# Patient Record
Sex: FEMALE | Race: WHITE | NOT HISPANIC OR LATINO | URBAN - METROPOLITAN AREA
[De-identification: names, ages, dates, MRNs, and addresses within clinical notes are randomized per-mention and may not be internally consistent; named-entity substitution may affect disease eponyms.]

---

## 2022-04-06 ENCOUNTER — FOLLOW UP (OUTPATIENT)
Dept: URBAN - METROPOLITAN AREA CLINIC 29 | Facility: CLINIC | Age: 82
End: 2022-04-06

## 2022-04-06 DIAGNOSIS — H16.223: ICD-10-CM

## 2022-04-06 DIAGNOSIS — Z96.1: ICD-10-CM

## 2022-04-06 PROCEDURE — 99203 OFFICE O/P NEW LOW 30 MIN: CPT

## 2022-04-06 ASSESSMENT — VISUAL ACUITY
OD_SC: 20/40
OS_SC: 20/30+1
OD_PH: 20/30-2

## 2022-04-06 NOTE — PATIENT DISCUSSION
Lens affected by dryness and residual refractive error had surgery in [place] get old records per pt she did have LVC after but it did not helpWill treat dryness first.

## 2022-05-06 ENCOUNTER — FOLLOW UP (OUTPATIENT)
Dept: URBAN - METROPOLITAN AREA CLINIC 29 | Facility: CLINIC | Age: 82
End: 2022-05-06

## 2022-05-06 DIAGNOSIS — H16.223: ICD-10-CM

## 2022-05-06 DIAGNOSIS — Z96.1: ICD-10-CM

## 2022-05-06 DIAGNOSIS — Z98.890: ICD-10-CM

## 2022-05-06 PROCEDURE — 99213 OFFICE O/P EST LOW 20 MIN: CPT

## 2022-05-06 PROCEDURE — 92025 CPTRIZED CORNEAL TOPOGRAPHY: CPT

## 2022-05-06 ASSESSMENT — VISUAL ACUITY
OS_SC: 20/25-3
OD_PH: 20/40+1
OD_SC: 20/50-1

## 2022-05-06 NOTE — PATIENT DISCUSSION
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

## 2022-07-08 ENCOUNTER — FOLLOW UP (OUTPATIENT)
Dept: URBAN - METROPOLITAN AREA CLINIC 29 | Facility: CLINIC | Age: 82
End: 2022-07-08

## 2022-07-08 DIAGNOSIS — Z96.1: ICD-10-CM

## 2022-07-08 DIAGNOSIS — H16.223: ICD-10-CM

## 2022-07-08 PROCEDURE — 99213 OFFICE O/P EST LOW 20 MIN: CPT

## 2022-07-08 PROCEDURE — 92025 CPTRIZED CORNEAL TOPOGRAPHY: CPT

## 2022-07-08 PROCEDURE — 83861 MICROFLUID ANALY TEARS: CPT

## 2022-07-08 ASSESSMENT — VISUAL ACUITY
OS_SC: 20/30
OD_SC: 20/25-2

## 2023-01-30 ENCOUNTER — FOLLOW UP (OUTPATIENT)
Dept: URBAN - METROPOLITAN AREA CLINIC 29 | Facility: CLINIC | Age: 83
End: 2023-01-30

## 2023-01-30 DIAGNOSIS — Z96.1: ICD-10-CM

## 2023-01-30 DIAGNOSIS — Z98.890: ICD-10-CM

## 2023-01-30 DIAGNOSIS — H16.223: ICD-10-CM

## 2023-01-30 PROCEDURE — 92012 INTRM OPH EXAM EST PATIENT: CPT

## 2023-01-30 ASSESSMENT — VISUAL ACUITY
OD_SC: 20/25
OS_SC: 20/20

## 2023-06-27 ENCOUNTER — OFFICE VISIT (OUTPATIENT)
Dept: URBAN - METROPOLITAN AREA CLINIC 9 | Facility: CLINIC | Age: 83
End: 2023-06-27
Payer: MEDICARE

## 2023-06-27 ENCOUNTER — WEB ENCOUNTER (OUTPATIENT)
Dept: URBAN - METROPOLITAN AREA CLINIC 9 | Facility: CLINIC | Age: 83
End: 2023-06-27

## 2023-06-27 VITALS
HEIGHT: 64 IN | SYSTOLIC BLOOD PRESSURE: 154 MMHG | DIASTOLIC BLOOD PRESSURE: 82 MMHG | BODY MASS INDEX: 19.46 KG/M2 | WEIGHT: 114 LBS

## 2023-06-27 DIAGNOSIS — K21.9 GASTROESOPHAGEAL REFLUX DISEASE, UNSPECIFIED WHETHER ESOPHAGITIS PRESENT: ICD-10-CM

## 2023-06-27 DIAGNOSIS — R19.7 DIARRHEA, UNSPECIFIED TYPE: ICD-10-CM

## 2023-06-27 PROCEDURE — 99204 OFFICE O/P NEW MOD 45 MIN: CPT | Performed by: STUDENT IN AN ORGANIZED HEALTH CARE EDUCATION/TRAINING PROGRAM

## 2023-06-27 RX ORDER — OLMESARTAN MEDOXOMIL 40 MG/1
TABLET, FILM COATED ORAL
Qty: 90 TABLET | Status: ACTIVE | COMMUNITY

## 2023-06-27 RX ORDER — ATENOLOL 50 MG/1
TAKE 1 TABLET BY MOUTH EVERY DAY TABLET ORAL
Qty: 90 EACH | Refills: 1 | Status: ACTIVE | COMMUNITY

## 2023-06-27 RX ORDER — PSYLLIUM HUSK (WITH SUGAR) 3 G/12 G
AS DIRECTED POWDER (GRAM) ORAL
OUTPATIENT
Start: 2023-07-02

## 2023-06-27 RX ORDER — AMLODIPINE BESYLATE 5 MG/1
TABLET ORAL
Qty: 90 TABLET | Status: ACTIVE | COMMUNITY

## 2023-06-27 RX ORDER — HYDRALAZINE HYDROCHLORIDE 100 MG/1
TAKE 1 TABLET BY MOUTH THREE TIMES A DAY TABLET ORAL
Qty: 270 EACH | Refills: 1 | Status: ACTIVE | COMMUNITY

## 2023-06-27 RX ORDER — SIMVASTATIN 20 MG/1
TABLET, FILM COATED ORAL
Qty: 90 TABLET | Status: ACTIVE | COMMUNITY

## 2023-06-27 NOTE — HPI-TODAY'S VISIT:
Patient has a history of HTN on olmesartan, HLD who presents for diarrhea  GI Hx: 6/27/23- Incontinence (every 3 days), Has urgency. Has multiple frequent BMs. [place] 6. No blood or mucous. Then has one large BM/evacuation. Dependent upon food intake. On olmesartan.  ROS includes and is negative for the below, unless specified positive above: Denies weight loss, fever, chills, abdominal pain, nausea, vomiting, diarrhea.  Denies dysphagia, reflux, UGI symptoms. Denies hematemesis, melena, hematochezia, blood per rectum.  EGD: None  Colonoscopy: 3 y/a- no records avialable. [facility]- Kamron Resendez M.D.  Imaging/Studies/Procedures: 6/6/23- CMP, Hgb 11.6, TSH normal. ESR 68 (H), FOBT neg.

## 2023-06-29 ENCOUNTER — TELEPHONE ENCOUNTER (OUTPATIENT)
Dept: URBAN - METROPOLITAN AREA CLINIC 9 | Facility: CLINIC | Age: 83
End: 2023-06-29

## 2023-06-30 ENCOUNTER — TELEPHONE ENCOUNTER (OUTPATIENT)
Dept: URBAN - METROPOLITAN AREA CLINIC 9 | Facility: CLINIC | Age: 83
End: 2023-06-30

## 2023-07-02 PROBLEM — 235595009: Status: ACTIVE | Noted: 2023-07-02

## 2023-07-15 LAB
(TTG) AB, IGA: <1
(TTG) AB, IGG: 1
A/G RATIO: 1.1
ABSOLUTE BASOPHILS: 19
ABSOLUTE EOSINOPHILS: 397
ABSOLUTE LYMPHOCYTES: 1066
ABSOLUTE MONOCYTES: 211
ABSOLUTE NEUTROPHILS: 4507
ALBUMIN: 3.8
ALKALINE PHOSPHATASE: 48
ALT (SGPT): 16
AST (SGOT): 26
BASOPHILS: 0.3
BILIRUBIN, TOTAL: 0.5
BUN/CREATININE RATIO: (no result)
BUN: 13
C-REACTIVE PROTEIN, QUANT: 5.6
CALCIUM: 9.4
CALPROTECTIN, FECAL: 139
CARBON DIOXIDE, TOTAL: 27
CHLORIDE: 99
CLOSTRIDIUM DIFFICILE: (no result)
CREATININE: 0.82
EGFR: 72
EOSINOPHILS: 6.4
GLIADIN (DEAMIDATED) AB (IGA): 1.5
GLIADIN (DEAMIDATED) AB (IGG): <1
GLOBULIN, TOTAL: 3.4
GLUCOSE: 137
HELICOBACTER PYLORI AG, EIA, STOOL: (no result)
HEMATOCRIT: 34.9
HEMOGLOBIN: 12
IMMUNOGLOBULIN A: 433
LYMPHOCYTES: 17.2
MCH: 30.9
MCHC: 34.4
MCV: 89.9
MONOCYTES: 3.4
MPV: 10.2
NEUTROPHILS: 72.7
PANCREATIC ELASTASE, FECAL: 390
PLATELET COUNT: 270
POTASSIUM: 4.5
PROTEIN, TOTAL: 7.2
RDW: 12.3
RED BLOOD CELL COUNT: 3.88
SODIUM: 139
TSH: 1.94
WHITE BLOOD CELL COUNT: 6.2

## 2023-07-16 ENCOUNTER — TELEPHONE ENCOUNTER (OUTPATIENT)
Dept: URBAN - METROPOLITAN AREA CLINIC 9 | Facility: CLINIC | Age: 83
End: 2023-07-16

## 2023-07-16 RX ORDER — OMEPRAZOLE 20 MG/1
1 CAPSULE 30 MINUTES BEFORE A MEAL CAPSULE, DELAYED RELEASE ORAL TWICE A DAY
Qty: 28 | Refills: 0 | OUTPATIENT

## 2023-07-16 RX ORDER — METRONIDAZOLE 250 MG/1
1 TABLET TABLET ORAL
Qty: 56 TABLET | Refills: 0 | OUTPATIENT

## 2023-07-16 RX ORDER — DOXYCYCLINE HYCLATE 100 MG/1
1 CAPSULE CAPSULE, GELATIN COATED ORAL TWICE A DAY
Qty: 28 CAPSULE | Refills: 0 | OUTPATIENT
Start: 2023-07-16 | End: 2023-07-30

## 2023-07-16 RX ORDER — BISMUTH SUBSALICYLATE 262 MG/1
2 TABLETS WITH MEALS AND AT BEDTIME TABLET, CHEWABLE ORAL
Qty: 112 TABLET | Refills: 0 | OUTPATIENT

## 2023-07-25 ENCOUNTER — OFFICE VISIT (OUTPATIENT)
Dept: URBAN - METROPOLITAN AREA CLINIC 9 | Facility: CLINIC | Age: 83
End: 2023-07-25

## 2023-07-27 ENCOUNTER — OFFICE VISIT (OUTPATIENT)
Dept: URBAN - METROPOLITAN AREA CLINIC 9 | Facility: CLINIC | Age: 83
End: 2023-07-27
Payer: MEDICARE

## 2023-07-27 VITALS
HEIGHT: 64 IN | BODY MASS INDEX: 19.63 KG/M2 | DIASTOLIC BLOOD PRESSURE: 81 MMHG | WEIGHT: 115 LBS | SYSTOLIC BLOOD PRESSURE: 160 MMHG

## 2023-07-27 DIAGNOSIS — R19.7 DIARRHEA, UNSPECIFIED TYPE: ICD-10-CM

## 2023-07-27 DIAGNOSIS — K21.9 GASTROESOPHAGEAL REFLUX DISEASE, UNSPECIFIED WHETHER ESOPHAGITIS PRESENT: ICD-10-CM

## 2023-07-27 DIAGNOSIS — A04.8 HELICOBACTER PYLORI (H. PYLORI) INFECTION: ICD-10-CM

## 2023-07-27 PROCEDURE — 99214 OFFICE O/P EST MOD 30 MIN: CPT | Performed by: STUDENT IN AN ORGANIZED HEALTH CARE EDUCATION/TRAINING PROGRAM

## 2023-07-27 RX ORDER — AMLODIPINE BESYLATE 5 MG/1
1 TABLET TABLET ORAL ONCE A DAY
Qty: 90 TABLET | Status: ACTIVE | COMMUNITY

## 2023-07-27 RX ORDER — BISMUTH SUBSALICYLATE 262 MG/1
2 TABLETS WITH MEALS AND AT BEDTIME TABLET, CHEWABLE ORAL
OUTPATIENT

## 2023-07-27 RX ORDER — HYDRALAZINE HYDROCHLORIDE 100 MG/1
TAKE 1 TABLET BY MOUTH THREE TIMES A DAY TABLET ORAL
Qty: 270 EACH | Refills: 1 | Status: ACTIVE | COMMUNITY

## 2023-07-27 RX ORDER — OMEPRAZOLE 20 MG/1
1 CAPSULE 30 MINUTES BEFORE A MEAL CAPSULE, DELAYED RELEASE ORAL TWICE A DAY
Qty: 28 | Refills: 0 | Status: ACTIVE | COMMUNITY

## 2023-07-27 RX ORDER — PSYLLIUM HUSK (WITH SUGAR) 3 G/12 G
AS DIRECTED POWDER (GRAM) ORAL
Status: ACTIVE | COMMUNITY
Start: 2023-07-02

## 2023-07-27 RX ORDER — ATENOLOL 50 MG/1
TAKE 1 TABLET BY MOUTH EVERY DAY TABLET ORAL ONCE A DAY
Refills: 1 | Status: ACTIVE | COMMUNITY

## 2023-07-27 RX ORDER — OLMESARTAN MEDOXOMIL 40 MG/1
1 TABLET TABLET, FILM COATED ORAL ONCE A DAY
Qty: 90 TABLET | Status: ACTIVE | COMMUNITY

## 2023-07-27 RX ORDER — SIMVASTATIN 20 MG/1
1 TABLET IN THE EVENING TABLET, FILM COATED ORAL ONCE A DAY
Qty: 90 TABLET | Status: ACTIVE | COMMUNITY

## 2023-07-27 RX ORDER — OMEPRAZOLE 20 MG/1
1 CAPSULE 30 MINUTES BEFORE A MEAL CAPSULE, DELAYED RELEASE ORAL TWICE A DAY
OUTPATIENT

## 2023-07-27 RX ORDER — DOXYCYCLINE HYCLATE 100 MG/1
1 CAPSULE CAPSULE, GELATIN COATED ORAL TWICE A DAY
OUTPATIENT
Start: 2023-07-16

## 2023-07-27 RX ORDER — DOXYCYCLINE HYCLATE 100 MG/1
1 CAPSULE CAPSULE, GELATIN COATED ORAL TWICE A DAY
Qty: 28 CAPSULE | Refills: 0 | Status: ACTIVE | COMMUNITY
Start: 2023-07-16 | End: 2023-07-30

## 2023-07-27 RX ORDER — METRONIDAZOLE 250 MG/1
1 TABLET TABLET ORAL
OUTPATIENT

## 2023-07-27 RX ORDER — BISMUTH SUBSALICYLATE 262 MG/1
2 TABLETS WITH MEALS AND AT BEDTIME TABLET, CHEWABLE ORAL
Qty: 112 TABLET | Refills: 0 | Status: ACTIVE | COMMUNITY

## 2023-07-27 RX ORDER — METRONIDAZOLE 250 MG/1
1 TABLET TABLET ORAL
Qty: 56 TABLET | Refills: 0 | Status: ACTIVE | COMMUNITY

## 2023-07-27 NOTE — HPI-TODAY'S VISIT:
Patient has a history of HTN on olmesartan, HLD who presents for diarrhea  GI Hx: 6/27/23- Incontinence (every 3 days), Has urgency. Has multiple frequent BMs. [place] 6. No blood or mucous. Then has one large BM/evacuation. Dependent upon food intake. On olmesartan. 7/27/23-  Symptoms much improved since starting H pylori treatment. She has 3 days left of therapy. Has less incotinence and less frequent BMs. Now more formed.  ROS includes and is negative for the below, unless specified positive above: Denies weight loss, fever, chills, abdominal pain, nausea, vomiting, diarrhea.  Denies dysphagia, reflux, UGI symptoms. Denies hematemesis, melena, hematochezia, blood per rectum.  EGD: None  Colonoscopy: 3 y/a- no records avialable. [facility]- Kamron Resendez M.D.  Imaging/Studies/Procedures: 6/6/23- CMP, Hgb 11.6, TSH normal. ESR 68 (H), FOBT neg. 7/10/23- celiac test, CRP, elastase, CMP, CBC, TSH, C diff, normal. calprotectin high (139)- likely from inflammtion from H pylori. H pylori positive.

## 2023-08-10 ENCOUNTER — TELEPHONE ENCOUNTER (OUTPATIENT)
Dept: URBAN - METROPOLITAN AREA CLINIC 9 | Facility: CLINIC | Age: 83
End: 2023-08-10

## 2023-08-17 ENCOUNTER — PREPPED CHART (OUTPATIENT)
Dept: URBAN - METROPOLITAN AREA CLINIC 29 | Facility: CLINIC | Age: 83
End: 2023-08-17

## 2023-08-29 LAB — RESULT:: (no result)

## 2023-09-05 ENCOUNTER — TELEPHONE ENCOUNTER (OUTPATIENT)
Dept: URBAN - METROPOLITAN AREA CLINIC 9 | Facility: CLINIC | Age: 83
End: 2023-09-05

## 2023-09-07 ENCOUNTER — OFFICE VISIT (OUTPATIENT)
Dept: URBAN - METROPOLITAN AREA CLINIC 7 | Facility: CLINIC | Age: 83
End: 2023-09-07
Payer: MEDICARE

## 2023-09-07 VITALS
HEIGHT: 64 IN | BODY MASS INDEX: 19.12 KG/M2 | DIASTOLIC BLOOD PRESSURE: 58 MMHG | TEMPERATURE: 97.4 F | WEIGHT: 112 LBS | SYSTOLIC BLOOD PRESSURE: 130 MMHG

## 2023-09-07 DIAGNOSIS — K21.9 GASTROESOPHAGEAL REFLUX DISEASE, UNSPECIFIED WHETHER ESOPHAGITIS PRESENT: ICD-10-CM

## 2023-09-07 DIAGNOSIS — R19.7 DIARRHEA, UNSPECIFIED TYPE: ICD-10-CM

## 2023-09-07 DIAGNOSIS — A04.8 HELICOBACTER PYLORI (H. PYLORI) INFECTION: ICD-10-CM

## 2023-09-07 PROCEDURE — 99214 OFFICE O/P EST MOD 30 MIN: CPT | Performed by: STUDENT IN AN ORGANIZED HEALTH CARE EDUCATION/TRAINING PROGRAM

## 2023-09-07 RX ORDER — METRONIDAZOLE 250 MG/1
1 TABLET TABLET ORAL
Status: DISCONTINUED | COMMUNITY

## 2023-09-07 RX ORDER — BISMUTH SUBSALICYLATE 262 MG/1
2 TABLETS WITH MEALS AND AT BEDTIME TABLET, CHEWABLE ORAL
Status: DISCONTINUED | COMMUNITY

## 2023-09-07 RX ORDER — OLMESARTAN MEDOXOMIL 40 MG/1
1 TABLET TABLET, FILM COATED ORAL ONCE A DAY
Qty: 90 TABLET | Status: ACTIVE | COMMUNITY

## 2023-09-07 RX ORDER — ATENOLOL 50 MG/1
TAKE 1 TABLET BY MOUTH EVERY DAY TABLET ORAL ONCE A DAY
Refills: 1 | Status: ACTIVE | COMMUNITY

## 2023-09-07 RX ORDER — BACILLUS COAGULANS/INULIN 1B-250 MG
AS DIRECTED CAPSULE ORAL
Status: ACTIVE | COMMUNITY

## 2023-09-07 RX ORDER — PSYLLIUM HUSK (WITH SUGAR) 3 G/12 G
AS DIRECTED POWDER (GRAM) ORAL
Status: ACTIVE | COMMUNITY
Start: 2023-07-02

## 2023-09-07 RX ORDER — PRENATAL 168/IRON/FOLIC/OMEGA3 27-800-235
AS DIRECTED CAPSULE ORAL
Status: ACTIVE | COMMUNITY

## 2023-09-07 RX ORDER — SIMVASTATIN 20 MG/1
1 TABLET IN THE EVENING TABLET, FILM COATED ORAL ONCE A DAY
Qty: 90 TABLET | Status: ACTIVE | COMMUNITY

## 2023-09-07 RX ORDER — HYDRALAZINE HYDROCHLORIDE 100 MG/1
TAKE 1 TABLET BY MOUTH THREE TIMES A DAY TABLET ORAL
Qty: 270 EACH | Refills: 1 | Status: ACTIVE | COMMUNITY

## 2023-09-07 RX ORDER — ZINC GLUCONATE 50 MG
1 TABLET TABLET ORAL ONCE A DAY
Status: ACTIVE | COMMUNITY

## 2023-09-07 RX ORDER — AMLODIPINE BESYLATE 5 MG/1
1 TABLET TABLET ORAL ONCE A DAY
Qty: 90 TABLET | Status: DISCONTINUED | COMMUNITY

## 2023-09-07 NOTE — PHYSICAL EXAM NECK/THYROID:
Normal appearance, trachea midline Subjective:       Patient ID: Reyes Schulte is a 45 y.o. female.    Chief Complaint: Nasal Congestion (Runny nose and sneezing started this morning. ), Headache (Intermittent for 2 days. ), and Generalized Body Aches (Started this morning. )    HPI  Review of Systems   Constitutional:  Positive for fatigue. Negative for activity change, appetite change, chills, diaphoresis, fever and unexpected weight change.   HENT:  Positive for nasal congestion, postnasal drip, rhinorrhea and sinus pressure/congestion. Negative for dental problem, drooling, ear discharge, ear pain, facial swelling, hearing loss, mouth sores, nosebleeds, sneezing, sore throat, tinnitus, trouble swallowing, voice change and goiter.    Eyes:  Negative for photophobia, discharge, itching and visual disturbance.   Respiratory:  Positive for cough. Negative for apnea, choking, chest tightness, shortness of breath, wheezing and stridor.    Cardiovascular:  Negative for chest pain, palpitations, leg swelling and claudication.   Gastrointestinal:  Negative for abdominal distention, abdominal pain, anal bleeding, blood in stool, change in bowel habit, constipation, diarrhea, nausea, vomiting and change in bowel habit.   Endocrine: Negative for cold intolerance, heat intolerance, polydipsia, polyphagia and polyuria.   Genitourinary:  Negative for bladder incontinence, decreased urine volume, difficulty urinating, dysuria, enuresis, flank pain, frequency, hematuria, nocturia, pelvic pain and urgency.   Musculoskeletal:  Negative for arthralgias, back pain, gait problem, joint swelling, leg pain, myalgias, neck pain, neck stiffness and joint deformity.   Integumentary:  Negative for pallor, rash, wound, breast mass and breast tenderness.   Allergic/Immunologic: Negative for environmental allergies, food allergies and immunocompromised state.   Neurological:  Negative for dizziness, vertigo, tremors, seizures, syncope, facial asymmetry, speech  difficulty, weakness, light-headedness, numbness, headaches, coordination difficulties, memory loss and coordination difficulties.   Hematological:  Negative for adenopathy. Does not bruise/bleed easily.   Psychiatric/Behavioral:  Negative for agitation, behavioral problems, confusion, decreased concentration, dysphoric mood, hallucinations, self-injury, sleep disturbance and suicidal ideas. The patient is not nervous/anxious and is not hyperactive.    Breast: Negative for mass and tenderness      Objective:      Physical Exam  Vitals reviewed.   Constitutional:       Appearance: Normal appearance.   HENT:      Head: Normocephalic and atraumatic.      Right Ear: Tympanic membrane, ear canal and external ear normal.      Left Ear: Tympanic membrane, ear canal and external ear normal.      Nose: Congestion and rhinorrhea present.      Mouth/Throat:      Mouth: Mucous membranes are moist.      Pharynx: Oropharynx is clear. Posterior oropharyngeal erythema present.   Eyes:      Extraocular Movements: Extraocular movements intact.      Conjunctiva/sclera: Conjunctivae normal.      Pupils: Pupils are equal, round, and reactive to light.   Cardiovascular:      Rate and Rhythm: Normal rate and regular rhythm.      Pulses: Normal pulses.      Heart sounds: Normal heart sounds.   Pulmonary:      Effort: Pulmonary effort is normal.      Breath sounds: Normal breath sounds.   Abdominal:      General: Bowel sounds are normal.      Palpations: Abdomen is soft.   Musculoskeletal:         General: Normal range of motion.      Cervical back: Normal range of motion and neck supple.   Skin:     General: Skin is warm and dry.   Neurological:      General: No focal deficit present.      Mental Status: She is alert. Mental status is at baseline.   Psychiatric:         Mood and Affect: Mood normal.         Behavior: Behavior normal.         Thought Content: Thought content normal.         Judgment: Judgment normal.       Assessment:        1. Sinusitis, unspecified chronicity, unspecified location    2. Body aches          Plan:     Sinusitis, unspecified chronicity, unspecified location  -     dexAMETHasone injection 4 mg  -     amoxicillin-clavulanate 875-125mg (AUGMENTIN) 875-125 mg per tablet; Take 1 tablet by mouth 2 (two) times a day. for 7 days  Dispense: 14 tablet; Refill: 0  -     predniSONE (DELTASONE) 20 MG tablet; Take 1 tablet (20 mg total) by mouth once daily. for 5 days  Dispense: 5 tablet; Refill: 0    Body aches  -     POCT Influenza A/B

## 2023-09-07 NOTE — HPI-TODAY'S VISIT:
Patient has a history of HTN on olmesartan, HLD, H pylori s/p tx with confirmed eradication in 8/2023 who presents for follow up.  GI Hx: 6/27/23- Incontinence (every 3 days), Has urgency. Has multiple frequent BMs. [place] 6. No blood or mucous. Then has one large BM/evacuation. Dependent upon food intake. On olmesartan. 7/27/23-  Symptoms much improved since starting H pylori treatment. She has 3 days left of therapy. Has less incotinence and less frequent BMs. Now more formed. ER 8/9/23- weakness, - PNA 9/7/23- Formed stools, much better than before. Still with increased frequency though- 1-3 BM/day. No blood.   ROS includes and is negative for the below, unless specified positive above: Denies weight loss, fever, chills, abdominal pain, nausea, vomiting, diarrhea.  Denies dysphagia, reflux, UGI symptoms. Denies hematemesis, melena, hematochezia, blood per rectum.  EGD: None  Colonoscopy: 9/2020- [facility]- Kamron Resendze M.D.- 6mm transverse polyp (bx: TA), 2mm rectal polyp (bx: HP). Two non bleeding angioectasias.  Imaging/Studies/Procedures: 6/6/23- CMP, Hgb 11.6, TSH normal. ESR 68 (H), FOBT neg. 7/10/23- celiac test, CRP, elastase, CMP, CBC, TSH, C diff, normal. calprotectin high (139)- likely from inflammtion from H pylori. H pylori positive. 8/10/23- Hgb normal. +Anti dsDNA anitbody (SLE), +Antichromatic Ab (drug induced SLE) 8/29/23- H pylori negative.

## 2024-03-08 ENCOUNTER — OV EP (OUTPATIENT)
Dept: URBAN - METROPOLITAN AREA CLINIC 7 | Facility: CLINIC | Age: 84
End: 2024-03-08
Payer: MEDICARE

## 2024-03-08 VITALS
BODY MASS INDEX: 16.73 KG/M2 | SYSTOLIC BLOOD PRESSURE: 166 MMHG | TEMPERATURE: 97.6 F | DIASTOLIC BLOOD PRESSURE: 58 MMHG | WEIGHT: 98 LBS | HEIGHT: 64 IN

## 2024-03-08 DIAGNOSIS — R63.4 WEIGHT LOSS: ICD-10-CM

## 2024-03-08 DIAGNOSIS — K21.9 GASTROESOPHAGEAL REFLUX DISEASE, UNSPECIFIED WHETHER ESOPHAGITIS PRESENT: ICD-10-CM

## 2024-03-08 DIAGNOSIS — R19.7 DIARRHEA, UNSPECIFIED TYPE: ICD-10-CM

## 2024-03-08 DIAGNOSIS — A04.8 HELICOBACTER PYLORI (H. PYLORI) INFECTION: ICD-10-CM

## 2024-03-08 PROCEDURE — 99214 OFFICE O/P EST MOD 30 MIN: CPT | Performed by: STUDENT IN AN ORGANIZED HEALTH CARE EDUCATION/TRAINING PROGRAM

## 2024-03-08 RX ORDER — HYDRALAZINE HYDROCHLORIDE 100 MG/1
TAKE 1 TABLET BY MOUTH THREE TIMES A DAY TABLET ORAL
Qty: 270 EACH | Refills: 1 | Status: DISCONTINUED | COMMUNITY

## 2024-03-08 RX ORDER — SIMVASTATIN 20 MG/1
1 TABLET IN THE EVENING TABLET, FILM COATED ORAL ONCE A DAY
Qty: 90 TABLET | Status: DISCONTINUED | COMMUNITY

## 2024-03-08 RX ORDER — PSYLLIUM HUSK (WITH SUGAR) 3 G/12 G
AS DIRECTED POWDER (GRAM) ORAL
Status: DISCONTINUED | COMMUNITY
Start: 2023-07-02

## 2024-03-08 RX ORDER — ZINC GLUCONATE 50 MG
1 TABLET TABLET ORAL ONCE A DAY
Status: ACTIVE | COMMUNITY

## 2024-03-08 RX ORDER — ATENOLOL 50 MG/1
TAKE 1 TABLET BY MOUTH EVERY DAY TABLET ORAL ONCE A DAY
Refills: 1 | Status: ACTIVE | COMMUNITY

## 2024-03-08 RX ORDER — BACILLUS COAGULANS/INULIN 1B-250 MG
AS DIRECTED CAPSULE ORAL
Status: ACTIVE | COMMUNITY

## 2024-03-08 RX ORDER — PRENATAL 168/IRON/FOLIC/OMEGA3 27-800-235
AS DIRECTED CAPSULE ORAL
Status: DISCONTINUED | COMMUNITY

## 2024-03-08 RX ORDER — BACILLUS COAGULANS/INULIN 1B-250 MG
AS DIRECTED CAPSULE ORAL
OUTPATIENT

## 2024-03-08 RX ORDER — OLMESARTAN MEDOXOMIL 40 MG/1
1 TABLET TABLET, FILM COATED ORAL ONCE A DAY
Qty: 90 TABLET | Status: ACTIVE | COMMUNITY

## 2024-03-08 NOTE — HPI-TODAY'S VISIT:
Patient has a history of HTN on olmesartan, HLD, H pylori s/p tx with confirmed eradication in 8/2023, drug induced lupus,   who presents for follow up.  GI Hx: 6/27/23- Incontinence (every 3 days), Has urgency. Has multiple frequent BMs. [place] 6. No blood or mucous. Then has one large BM/evacuation. Dependent upon food intake. On olmesartan. 7/27/23-  Symptoms much improved since starting H pylori treatment. She has 3 days left of therapy. Has less incotinence and less frequent BMs. Now more formed. ER 8/9/23- weakness, - PNA 9/7/23- Formed stools, much better than before. Still with increased frequency though- 1-3 BM/day. No blood.  ER 2/7/24- weakness, HTN urgenct, SLE,  3/8/24- Weight loss 10 lbs over a few months. Feels that anything she eats, bothers her stomach. Feels like she has a slow digestion. +Bloating.  ROS includes and is negative for the below, unless specified positive above: Denies weight loss, fever, chills, abdominal pain, nausea, vomiting, diarrhea.  Denies dysphagia, reflux, UGI symptoms. Denies hematemesis, melena, hematochezia, blood per rectum.  EGD: None  Colonoscopy: 9/2020- [facility]- Kamron Resendez M.D.- 6mm transverse polyp (bx: TA), 2mm rectal polyp (bx: HP). Two non bleeding angioectasias.  Imaging/Studies/Procedures: 6/6/23- CMP, Hgb 11.6, TSH normal. ESR 68 (H), FOBT neg. 7/10/23- celiac test, CRP, elastase, CMP, CBC, TSH, C diff, normal. calprotectin high (139)- likely from inflammtion from H pylori. H pylori positive. 8/10/23- Hgb normal. +Anti dsDNA anitbody (SLE), +Antichromatic Ab (drug induced SLE) 8/29/23- H pylori negative. CT A/P 10/18/23- rectal edema, wall thcikeing, mild peripheral stranding.  12/12/23- Fe 29 (L), 11%, ferritin 290, CMP normal. Hapto high. B12/folate normal.  CT chest 2/3/24- normal. 2/7/24- CBC (Hgb 10.1), CMP (AST 41)

## 2024-03-28 ENCOUNTER — OV EP (OUTPATIENT)
Dept: URBAN - METROPOLITAN AREA CLINIC 9 | Facility: CLINIC | Age: 84
End: 2024-03-28

## 2024-04-04 ENCOUNTER — COL/EGD (OUTPATIENT)
Dept: URBAN - METROPOLITAN AREA SURGERY CENTER 9 | Facility: SURGERY CENTER | Age: 84
End: 2024-04-04
Payer: MEDICARE

## 2024-04-04 ENCOUNTER — LAB (OUTPATIENT)
Dept: URBAN - METROPOLITAN AREA CLINIC 4 | Facility: CLINIC | Age: 84
End: 2024-04-04
Payer: MEDICARE

## 2024-04-04 DIAGNOSIS — T47.8X5A ADVERSE EFFECT OF OTHER AGENTS PRIMARILY AFFECTING GASTROINTESTINAL SYSTEM, INITIAL ENCOUNTER: ICD-10-CM

## 2024-04-04 DIAGNOSIS — K64.0 FIRST DEGREE HEMORRHOIDS: ICD-10-CM

## 2024-04-04 DIAGNOSIS — K44.9 DIAPHRAGMATIC HERNIA WITHOUT OBSTRUCTION OR GANGRENE: ICD-10-CM

## 2024-04-04 DIAGNOSIS — B37.81 CANDIDAL ESOPHAGITIS: ICD-10-CM

## 2024-04-04 DIAGNOSIS — K29.70 GASTRITIS WITHOUT BLEEDING, UNSPECIFIED CHRONICITY, UNSPECIFIED GASTRITIS TYPE: ICD-10-CM

## 2024-04-04 DIAGNOSIS — K20.90 ESOPHAGITIS, UNSPECIFIED WITHOUT BLEEDING: ICD-10-CM

## 2024-04-04 DIAGNOSIS — K55.20 ANGIODYSPLASIA OF COLON WITHOUT HEMORRHAGE: ICD-10-CM

## 2024-04-04 DIAGNOSIS — K63.89 OTHER SPECIFIED DISEASES OF INTESTINE: ICD-10-CM

## 2024-04-04 DIAGNOSIS — K22.89 OTHER SPECIFIED DISEASE OF ESOPHAGUS: ICD-10-CM

## 2024-04-04 DIAGNOSIS — K31.89 OTHER DISEASES OF STOMACH AND DUODENUM: ICD-10-CM

## 2024-04-04 DIAGNOSIS — K29.40 CHRONIC ATROPHIC GASTRITIS WITHOUT BLEEDING: ICD-10-CM

## 2024-04-04 PROCEDURE — 45380 COLONOSCOPY AND BIOPSY: CPT | Performed by: STUDENT IN AN ORGANIZED HEALTH CARE EDUCATION/TRAINING PROGRAM

## 2024-04-04 PROCEDURE — 88313 SPECIAL STAINS GROUP 2: CPT | Performed by: PATHOLOGY

## 2024-04-04 PROCEDURE — 88305 TISSUE EXAM BY PATHOLOGIST: CPT | Performed by: PATHOLOGY

## 2024-04-04 PROCEDURE — 88341 IMHCHEM/IMCYTCHM EA ADD ANTB: CPT | Performed by: PATHOLOGY

## 2024-04-04 PROCEDURE — 88312 SPECIAL STAINS GROUP 1: CPT | Performed by: PATHOLOGY

## 2024-04-04 PROCEDURE — 88342 IMHCHEM/IMCYTCHM 1ST ANTB: CPT | Performed by: PATHOLOGY

## 2024-04-04 PROCEDURE — 43239 EGD BIOPSY SINGLE/MULTIPLE: CPT | Performed by: STUDENT IN AN ORGANIZED HEALTH CARE EDUCATION/TRAINING PROGRAM

## 2024-04-04 RX ORDER — OMEPRAZOLE 20 MG/1
1 CAPSULE 30 MINUTES BEFORE A MEAL CAPSULE, DELAYED RELEASE ORAL ONCE A DAY
Qty: 90 | Refills: 1 | OUTPATIENT
Start: 2024-04-06

## 2024-04-04 RX ORDER — OLMESARTAN MEDOXOMIL 40 MG/1
1 TABLET TABLET, FILM COATED ORAL ONCE A DAY
Qty: 90 TABLET | Status: ACTIVE | COMMUNITY

## 2024-04-04 RX ORDER — ATENOLOL 50 MG/1
TAKE 1 TABLET BY MOUTH EVERY DAY TABLET ORAL ONCE A DAY
Refills: 1 | Status: ACTIVE | COMMUNITY

## 2024-04-04 RX ORDER — ZINC GLUCONATE 50 MG
1 TABLET TABLET ORAL ONCE A DAY
Status: ACTIVE | COMMUNITY

## 2024-04-04 NOTE — HPI-TODAY'S VISIT:
Patient has a history of HTN on olmesartan, HLD, H pylori s/p tx with confirmed eradication in 8/2023, drug induced lupus,   who presents for follow up.  GI Hx: 6/27/23- Incontinence (every 3 days), Has urgency. Has multiple frequent BMs. [place] 6. No blood or mucous. Then has one large BM/evacuation. Dependent upon food intake. On olmesartan. 7/27/23-  Symptoms much improved since starting H pylori treatment. She has 3 days left of therapy. Has less incotinence and less frequent BMs. Now more formed. ER 8/9/23- weakness, - PNA 9/7/23- Formed stools, much better than before. Still with increased frequency though- 1-3 BM/day. No blood.  ER 2/7/24- weakness, HTN urgenct, SLE,  3/8/24- Weight loss 10 lbs over a few months. Feels that anything she eats, bothers her stomach. Feels like she has a slow digestion. +Bloating.  ROS includes and is negative for the below, unless specified positive above: Denies weight loss, fever, chills, abdominal pain, nausea, vomiting, diarrhea.  Denies dysphagia, reflux, UGI symptoms. Denies hematemesis, melena, hematochezia, blood per rectum.  EGD: None  Colonoscopy: 9/2020- [facility]- Kamron Resendez M.D.- 6mm transverse polyp (bx: TA), 2mm rectal polyp (bx: HP). Two non bleeding angioectasias.  Imaging/Studies/Procedures: 6/6/23- CMP, Hgb 11.6, TSH normal. ESR 68 (H), FOBT neg. 7/10/23- celiac test, CRP, elastase, CMP, CBC, TSH, C diff, normal. calprotectin high (139)- likely from inflammtion from H pylori. H pylori positive. 8/10/23- Hgb normal. +Anti dsDNA anitbody (SLE), +Antichromatic Ab (drug induced SLE) 8/29/23- H pylori negative. CT A/P 10/18/23- rectal edema, wall thcikeing, mild peripheral stranding.  12/12/23- Fe 29 (L), 11%, ferritin 290, CMP normal. Hapto high. B12/folate normal.  CT chest 2/3/24- normal. 2/7/24- CBC (Hgb 10.1), CMP (AST 41)

## 2024-04-29 PROBLEM — 19311003: Status: ACTIVE | Noted: 2024-04-29

## 2024-06-10 ENCOUNTER — OFFICE VISIT (OUTPATIENT)
Dept: URBAN - METROPOLITAN AREA CLINIC 9 | Facility: CLINIC | Age: 84
End: 2024-06-10